# Patient Record
Sex: MALE | Race: BLACK OR AFRICAN AMERICAN
[De-identification: names, ages, dates, MRNs, and addresses within clinical notes are randomized per-mention and may not be internally consistent; named-entity substitution may affect disease eponyms.]

---

## 2021-09-06 ENCOUNTER — HOSPITAL ENCOUNTER (EMERGENCY)
Dept: HOSPITAL 76 - ED | Age: 21
Discharge: HOME | End: 2021-09-06
Payer: COMMERCIAL

## 2021-09-06 VITALS — DIASTOLIC BLOOD PRESSURE: 76 MMHG | SYSTOLIC BLOOD PRESSURE: 132 MMHG
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
## 2021-09-06 DIAGNOSIS — S63.501A: Primary | ICD-10-CM

## 2021-09-06 PROCEDURE — 99283 EMERGENCY DEPT VISIT LOW MDM: CPT

## 2021-09-06 PROCEDURE — 99282 EMERGENCY DEPT VISIT SF MDM: CPT

## 2021-09-06 NOTE — ED PHYSICIAN DOCUMENTATION
PD HPI UPPER EXT INJURY





- Stated complaint


Stated Complaint: RT WRIST INJURY





- Chief complaint


Chief Complaint: Ext Problem





- History obtained from


History obtained from: Patient





- History of Present Illness


Location: Right, Wrist


Timing - onset: How many weeks ago (2)


Timing - duration: Weeks (2)


Timing - details: Gradual onset


Pain level max: 5


Pain level now: 4


Improved by: Rest


Worsened by: Moving, Palpating


Associated symptoms: No: Weakness, Numbness, Tingling, Swelling, Discolored





- Additonal information


Additional information: 





Patient is a 21-year-old male who states that he has been having right wrist 

pain for the past 2 weeks.  He states he was boxing today for a few hours and 

after boxing the pain had increased in the wrist.  Worse with movement, better 

with rest.  Patient is right-handed.





Review of Systems


Constitutional: denies: Fever


Neurologic: denies: Head injury





PD PAST MEDICAL HISTORY





- Past Medical History


Past Medical History: No





- Past Surgical History


Past Surgical History: No





- Allergies


Allergies/Adverse Reactions: 


                                    Allergies











Allergy/AdvReac Type Severity Reaction Status Date / Time


 


No Known Drug Allergies Allergy   Verified 09/06/21 18:55














- Living Situation


Living Arrangement: reports: At home





- Social History


Does the pt have substance abuse?: No





- Family History


Family history: reports: Non contributory





PD ED PE NORMAL





- Vitals


Vital signs reviewed: Yes





- General


General: Alert and oriented X 3, No acute distress





- HEENT


HEENT: Moist mucous membranes





- Derm


Derm: Warm and dry





- Extremities


Extremities: Other (No bony tenderness over the right wrist.  No snuffbox 

tenderness.  Neurovascularly intact.  No pain with range of motion of the 

fingers, mild pain with range of motion of the right thumb.  Most of the pain is

with flexion and extension of the wrist.)





- Neuro


Neuro: Alert and oriented X 3





Results





- Vitals


Vitals: 


                               Vital Signs - 24 hr











  09/06/21 09/06/21





  18:55 20:12


 


Temperature 36.5 C 


 


Heart Rate 88 72


 


Respiratory 16 16





Rate  


 


Blood Pressure 130/60 132/76 H


 


O2 Saturation 96 98








                                     Oxygen











O2 Source                      Room air

















- Rads (name of study)


  ** Right wrist x-ray


Radiology: Final report received, EMP read contemporaneously, See rad report (no

acute abnormality)





PD MEDICAL DECISION MAKING





- ED course


Complexity details: reviewed results, considered differential, d/w patient


ED course: 





Placed in a Velcro thumb spica for comfort.  We will have him follow-up with his

doctor for further care.  No acute findings on x-ray.  Patient counseled 

regarding signs and symptoms for which I believe and urgent re-evaluation would 

be necessary. Patient with good understanding of and agreement to plan and is 

comfortable going home at this time





This document was made in part using voice recognition software. While efforts 

are made to proofread this document, sound alike and grammatical errors may 

occur.





Departure





- Departure


Disposition: 01 Home, Self Care


Clinical Impression: 


Sprain of wrist, right


Qualifiers:


 Encounter type: initial encounter Qualified Code(s): S63.501A - Unspecified 

sprain of right wrist, initial encounter





Condition: Good


Instructions:  ED Sprain Wrist


Follow-Up: 


Provider,Other [Primary Care Provider] - 


[facility] [Provider Group] - Within 1 week


Comments: 


Your x-ray does not show any acute abnormalities tonight.  Please wear the 

splint [place]  It is important you follow-up with 

them to ensure proper healing.


Discharge Date/Time: 09/06/21 20:12

## 2021-09-06 NOTE — XRAY REPORT
PROCEDURE:  Wrist 4 View RT

 

INDICATIONS: Trauma

 

TECHNIQUE:  4 views of the wrist were acquired.  

 

COMPARISON:  None

 

FINDINGS:  

 

Bones:  No fractures or dislocations.  No suspicious bony lesions.  Scapholunate interval is maintain
ed.

 

Scaphoid view:  Scaphoid appears intact. Scapholunate interval is preserved.

 

Soft tissues:  No suspicious soft tissue calcifications.  

 

IMPRESSION:  

Right wrist without acute radiographic abnormalities. Normal alignment.

 

Reviewed by: Clayton Alvarez MD on 9/6/2021 7:21 PM PDT

Approved by: Clayton Alvarez MD on 9/6/2021 7:21 PM PDT

 

 

Station ID:  [id number]

## 2022-01-03 ENCOUNTER — HOSPITAL ENCOUNTER (OUTPATIENT)
Dept: HOSPITAL 76 - DI.N | Age: 22
Discharge: HOME | End: 2022-01-03
Attending: FAMILY MEDICINE
Payer: COMMERCIAL

## 2022-01-03 DIAGNOSIS — S60.222A: Primary | ICD-10-CM

## 2022-01-06 NOTE — XRAY REPORT
PROCEDURE:  Hand 3 View LT

 

INDICATIONS:  CONTUSION OF L HAND

 

TECHNIQUE:  3 views of the hand(s) acquired.  

 

COMPARISON:  None

 

FINDINGS:  

 

Bones:  No fractures or dislocations.  No suspicious bony lesions.  

 

Soft tissues:  No suspicious soft tissue calcifications.  

 

IMPRESSION: Normal left hand x-ray

 

Reviewed by: Kody Rosas on 1/6/2022 8:44 AM PST

Approved by: Kody Rosas on 1/6/2022 8:44 AM [facility]

 

 

Station ID:  [id number]

## 2022-02-06 ENCOUNTER — HOSPITAL ENCOUNTER (OUTPATIENT)
Dept: HOSPITAL 76 - DI.N | Age: 22
End: 2022-02-06
Attending: NURSE PRACTITIONER
Payer: COMMERCIAL

## 2022-02-06 DIAGNOSIS — S83.401A: Primary | ICD-10-CM

## 2022-02-06 DIAGNOSIS — M25.461: ICD-10-CM

## 2022-02-06 NOTE — XRAY REPORT
PROCEDURE:  Knee 2 View RT

 

INDICATIONS:  SPRAIN OF RIGHT KNEE

 

TECHNIQUE:  2 views of the right knee(s) were acquired.  

 

COMPARISON:  None.

 

FINDINGS:  

 

Bones:  No fractures or dislocations.  No suspicious bony lesions.  

 

Soft tissues: There is a mild joint effusion.  No suspicious soft tissue calcifications.  

 

 

IMPRESSION:  Mild joint effusion, without an acute bony abnormality seen.

 

If it would be helpful for clinical management decision making, please consider a dedicated, schedule
d knee MRI for further evaluation (assuming that there is no contraindication).  

 

Reviewed by: Syed Hoff MD on 2/6/2022 4:42 PM [facility]

Approved by: Syed Hoff MD on 2/6/2022 4:42 PM [facility]

 

 

Station ID:  [id number]